# Patient Record
Sex: FEMALE | Race: WHITE | Employment: STUDENT | URBAN - METROPOLITAN AREA
[De-identification: names, ages, dates, MRNs, and addresses within clinical notes are randomized per-mention and may not be internally consistent; named-entity substitution may affect disease eponyms.]

---

## 2017-10-06 ENCOUNTER — HOSPITAL ENCOUNTER (OUTPATIENT)
Age: 22
Setting detail: SPECIMEN
Discharge: HOME | End: 2017-10-06
Payer: COMMERCIAL

## 2017-10-06 ENCOUNTER — OFFICE VISIT (OUTPATIENT)
Dept: FAMILY MEDICINE CLINIC | Age: 22
End: 2017-10-06

## 2017-10-06 VITALS
DIASTOLIC BLOOD PRESSURE: 70 MMHG | HEIGHT: 62 IN | RESPIRATION RATE: 12 BRPM | BODY MASS INDEX: 25.76 KG/M2 | TEMPERATURE: 98.1 F | SYSTOLIC BLOOD PRESSURE: 110 MMHG | HEART RATE: 72 BPM | WEIGHT: 140 LBS

## 2017-10-06 DIAGNOSIS — J02.9 SORE THROAT: Primary | ICD-10-CM

## 2017-10-06 DIAGNOSIS — J02.9 SORE THROAT: ICD-10-CM

## 2017-10-06 LAB — S PYO AG THROAT QL: NORMAL

## 2017-10-06 PROCEDURE — 99202 OFFICE O/P NEW SF 15 MIN: CPT | Performed by: NURSE PRACTITIONER

## 2017-10-06 PROCEDURE — 87070 CULTURE OTHR SPECIMN AEROBIC: CPT

## 2017-10-06 PROCEDURE — 87880 STREP A ASSAY W/OPTIC: CPT | Performed by: NURSE PRACTITIONER

## 2017-10-06 ASSESSMENT — ENCOUNTER SYMPTOMS
EYE DISCHARGE: 0
SHORTNESS OF BREATH: 0
WHEEZING: 0
SWOLLEN GLANDS: 1
TROUBLE SWALLOWING: 0
NAUSEA: 0
COUGH: 0
RHINORRHEA: 0
SORE THROAT: 1
SINUS PRESSURE: 0

## 2017-10-06 ASSESSMENT — PATIENT HEALTH QUESTIONNAIRE - PHQ9
SUM OF ALL RESPONSES TO PHQ QUESTIONS 1-9: 0
1. LITTLE INTEREST OR PLEASURE IN DOING THINGS: 0
SUM OF ALL RESPONSES TO PHQ9 QUESTIONS 1 & 2: 0
2. FEELING DOWN, DEPRESSED OR HOPELESS: 0

## 2017-10-06 NOTE — MR AVS SNAPSHOT
After Visit Summary             Magdy Min   10/6/2017 2:45 PM   Office Visit    Description:  Female : 1995   Provider:  Ken Israel NP   Department:  [facility]              Your Follow-Up and Future Appointments         Below is a list of your follow-up and future appointments. This may not be a complete list as you may have made appointments directly with providers that we are not aware of or your providers may have made some for you. Please call your providers to confirm appointments. It is important to keep your appointments. Please bring your current insurance card, photo ID, co-pay, and all medication bottles to your appointment. If self-pay, payment is expected at the time of service. Your To-Do List     Future Orders Complete By Expires    Throat culture [FCK209 Custom]  10/6/2017 10/6/2018         Information from Your Visit        Department     Name Address Phone Fax    [address]  [phone]      You Were Seen for:         Comments    Sore throat   [[id number]]         Vital Signs     Blood Pressure Pulse Temperature Respirations Height Weight    110/70 (Site: Left Arm, Position: Sitting, Cuff Size: Medium Adult) 72 98.1 °F (36.7 °C) (Temporal) 12 5' 2\" (1.575 m) 140 lb (63.5 kg)    Body Mass Index Smoking Status                25.61 kg/m2 Never Smoker          Additional Information about your Body Mass Index (BMI)           Your BMI as listed above is considered overweight (25.0-29.9). BMI is an estimate of body fat, calculated from your height and weight. The higher your BMI, the greater your risk of heart disease, high blood pressure, type 2 diabetes, stroke, gallstones, arthritis, sleep apnea, and certain cancers. BMI is not perfect. It may overestimate body fat in athletes and people who are more muscular.   If your body fat is high you can improve your BMI by decreasing your calorie intake and becoming more physically active. Learn more at: [employer]             Medications and Orders      Allergies           No Known Allergies      We Ordered/Performed the following           POCT rapid strep A          Result Summary for POCT rapid strep A      Result Information       Status          Normal Final result (Collected: 10/6/2017  3:04 PM)           10/6/2017  3:04 PM      Component Results     Component Value Ref Range & Units Status    Strep A Ag None Detected None Detected Final               Additional Information        Basic Information     Date Of Birth Sex Race Ethnicity Preferred Language    1995 Female White Non-/Non  English      Preventive Care        Date Due    HIV screening is recommended for all people regardless of risk factors  aged 15-65 years at least once (lifetime) who have never been HIV tested. 6/12/2012    Meningococcal Vaccine (1 of 1) 6/12/2013    Tetanus Combination Vaccine (1 - Tdap) 6/12/2016    Yearly Flu Vaccine (1) 9/1/2017            [employer] Signup           imeem allows you to send messages to your doctor, view your test results, renew your prescriptions, schedule appointments, view visit notes, and more. How Do I Sign Up? 1. In your Internet browser, go to [email or web address]  2. Click on the Sign Up Now link in the Sign In box. You will see the New Member Sign Up page. 3. Enter your imeem Access Code exactly as it appears below. You will not need to use this code after youve completed the sign-up process. If you do not sign up before the expiration date, you must request a new code. imeem Access Code: [email or web address]  Expires: 12/5/2017  3:16 PM    4. Enter your Social Security Number ([id number]) and Date of Birth (mm/dd/yyyy) as indicated and click Submit. You will be taken to the next sign-up page.

## 2017-10-06 NOTE — PROGRESS NOTES
nausea. Skin: Negative for rash. Neurological: Negative for dizziness, weakness, light-headedness and headaches. Objective:   /70 (Site: Left Arm, Position: Sitting, Cuff Size: Medium Adult)  Pulse 72  Temp 98.1 °F (36.7 °C) (Temporal)   Resp 12  Ht 5' 2\" (1.575 m)  Wt 140 lb (63.5 kg)  BMI 25.61 kg/m2    Physical Exam   Constitutional: She is oriented to person, place, and time. She appears well-developed and well-nourished. No distress. HENT:   Head: Normocephalic and atraumatic. Right Ear: Tympanic membrane, external ear and ear canal normal.   Left Ear: Tympanic membrane, external ear and ear canal normal.   Nose: Nose normal.   Mouth/Throat: Uvula is midline, oropharynx is clear and moist and mucous membranes are normal.   Eyes: Conjunctivae are normal. Right eye exhibits no discharge. Left eye exhibits no discharge. Neck: Normal range of motion. Neck supple. Cardiovascular: Normal rate, regular rhythm and normal heart sounds. Pulmonary/Chest: Effort normal and breath sounds normal. She has no decreased breath sounds. She has no wheezes. She has no rhonchi. She has no rales. Lymphadenopathy:     She has no cervical adenopathy. Neurological: She is alert and oriented to person, place, and time. Skin: Skin is warm and dry. She is not diaphoretic. Assessment & Plan:     1. Sore throat  POCT rapid strep A    Throat culture     Orders Placed This Encounter   Procedures    Throat culture     Standing Status:   Future     Standing Expiration Date:   10/6/2018    POCT rapid strep A           Rapid strep negative. Culture sent will treat if indicated. Return if symptoms worsen or fail to improve. Reviewed with the patient: current clinical status, medications, activities and diet.      Side effects, adverse effects of the medication prescribed today, as well as treatment plan/ rationale and result expectations have been discussed with the patient who expresses understanding and desires to proceed. Pt instructions reviewed and given to patient.     Close follow up to evaluate treatment results and for coordination of care. I have reviewed the patient's medical history in detail and updated the computerized patient record.     Marilee Blankenship NP

## 2017-10-08 LAB — THROAT CULTURE: NORMAL

## 2017-11-29 ENCOUNTER — OFFICE VISIT (OUTPATIENT)
Dept: SURGERY | Age: 22
End: 2017-11-29

## 2017-11-29 VITALS
SYSTOLIC BLOOD PRESSURE: 92 MMHG | HEART RATE: 92 BPM | BODY MASS INDEX: 23.21 KG/M2 | OXYGEN SATURATION: 99 % | HEIGHT: 63 IN | DIASTOLIC BLOOD PRESSURE: 48 MMHG | WEIGHT: 131 LBS

## 2017-11-29 DIAGNOSIS — N94.9 LUMP IN VAGINA: Primary | ICD-10-CM

## 2017-11-29 PROCEDURE — G8420 CALC BMI NORM PARAMETERS: HCPCS | Performed by: COLON & RECTAL SURGERY

## 2017-11-29 PROCEDURE — G8427 DOCREV CUR MEDS BY ELIG CLIN: HCPCS | Performed by: COLON & RECTAL SURGERY

## 2017-11-29 PROCEDURE — G8484 FLU IMMUNIZE NO ADMIN: HCPCS | Performed by: COLON & RECTAL SURGERY

## 2017-11-29 PROCEDURE — 99243 OFF/OP CNSLTJ NEW/EST LOW 30: CPT | Performed by: COLON & RECTAL SURGERY

## 2017-11-29 ASSESSMENT — ENCOUNTER SYMPTOMS
RESPIRATORY NEGATIVE: 1
EYES NEGATIVE: 1
ALLERGIC/IMMUNOLOGIC NEGATIVE: 1
GASTROINTESTINAL NEGATIVE: 1

## 2017-11-29 NOTE — PROGRESS NOTES
New Patient Consult     Reason for Visit:  Brian Wallace is a 23 y.o. female who is scheduled for a consult. The patient states that she's been feeling a lump between her  anus and vagina for the last month. She denies any drainage she denies any change of bowel habits or any other symptoms. The lump has not changed in size. Chief Complaint   Patient presents with    Consultation       History of Present Illness:      History reviewed. No pertinent past medical history. Past Surgical History:   Procedure Laterality Date    COLONOSCOPY  2/12/16     DR. SANON      Social History     Social History    Marital status: Single     Spouse name: N/A    Number of children: N/A    Years of education: N/A     Occupational History    Not on file. Social History Main Topics    Smoking status: Never Smoker    Smokeless tobacco: Never Used    Alcohol use Yes      Comment: occasionally     Drug use: Unknown    Sexual activity: Not on file     Other Topics Concern    Not on file     Social History Narrative    No narrative on file     History reviewed. No pertinent family history. There is no problem list on file for this patient. No current outpatient prescriptions on file prior to visit. No current facility-administered medications on file prior to visit. Allergies:  Review of patient's allergies indicates no known allergies. Review of Systems:    Review of Systems   Constitutional: Negative. HENT: Negative. Eyes: Negative. Respiratory: Negative. Cardiovascular: Negative. Gastrointestinal: Negative. Endocrine: Negative. Genitourinary: Positive for frequency and urgency. Musculoskeletal: Negative. Skin: Negative. Allergic/Immunologic: Negative. Neurological: Negative. Hematological: Negative. Psychiatric/Behavioral: Negative. Physical Exam:    Physical Exam   Constitutional: She is oriented to person, place, and time.  She appears well-developed